# Patient Record
Sex: MALE | Race: BLACK OR AFRICAN AMERICAN
[De-identification: names, ages, dates, MRNs, and addresses within clinical notes are randomized per-mention and may not be internally consistent; named-entity substitution may affect disease eponyms.]

---

## 2020-08-07 ENCOUNTER — HOSPITAL ENCOUNTER (EMERGENCY)
Dept: HOSPITAL 72 - EMR | Age: 30
Discharge: HOME | End: 2020-08-07
Payer: COMMERCIAL

## 2020-08-07 VITALS — HEIGHT: 71 IN | BODY MASS INDEX: 21 KG/M2 | WEIGHT: 150 LBS

## 2020-08-07 VITALS — DIASTOLIC BLOOD PRESSURE: 78 MMHG | SYSTOLIC BLOOD PRESSURE: 132 MMHG

## 2020-08-07 VITALS — SYSTOLIC BLOOD PRESSURE: 144 MMHG | DIASTOLIC BLOOD PRESSURE: 88 MMHG
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
## 2020-08-07 DIAGNOSIS — S01.81XA: ICD-10-CM
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
## 2020-08-07 DIAGNOSIS — S01.511A: ICD-10-CM

## 2020-08-07 DIAGNOSIS — S09.90XA: Primary | ICD-10-CM

## 2020-08-07 DIAGNOSIS — S60.222A: ICD-10-CM

## 2020-08-07 DIAGNOSIS — S40.011A: ICD-10-CM

## 2020-08-07 PROCEDURE — 73130 X-RAY EXAM OF HAND: CPT

## 2020-08-07 PROCEDURE — 12013 RPR F/E/E/N/L/M 2.6-5.0 CM: CPT

## 2020-08-07 PROCEDURE — 99284 EMERGENCY DEPT VISIT MOD MDM: CPT

## 2020-08-07 PROCEDURE — 70450 CT HEAD/BRAIN W/O DYE: CPT

## 2020-08-07 NOTE — DIAGNOSTIC IMAGING REPORT
INDICATION: Hand pain

 

TECHNIQUE: Multiple views of the left hand were obtained  

 

COMPARISON: None 

 

FINDINGS:

 

 There is no acute fracture or dislocation. Joint spaces are maintained. There is

deformity of the fifth metacarpal head, which demonstrates palmar angulation,

presumably an old healed fracture. No acute soft tissue abnormality. 

 

IMPRESSION: 

 

No acute fracture or dislocation.
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
